# Patient Record
Sex: FEMALE | Race: WHITE | Employment: STUDENT | URBAN - METROPOLITAN AREA
[De-identification: names, ages, dates, MRNs, and addresses within clinical notes are randomized per-mention and may not be internally consistent; named-entity substitution may affect disease eponyms.]

---
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
## 2020-03-02 NOTE — PROGRESS NOTES
Luiz Abad is a 22year old female  Patient's last menstrual period was 2018 (exact date). Patient presents with:  ER F/U: on 07/15/2018. New pt. Positive preg. test in ER. Discuss US results.    .Patient went to ER with abdominal cramp breath  Gastrointestinal:  denies heartburn, abdominal pain, diarrhea or constipation  Genitourinary:  denies dysuria, incontinence, abnormal vaginal discharge, vaginal itching  Musculoskeletal:  denies back pain.   Skin/Breast:  Denies any breast pain, lum 52

## 2022-06-11 ENCOUNTER — OFFICE VISIT (OUTPATIENT)
Dept: FAMILY MEDICINE CLINIC | Facility: CLINIC | Age: 22
End: 2022-06-11
Payer: MEDICAID

## 2022-06-11 VITALS
SYSTOLIC BLOOD PRESSURE: 126 MMHG | HEART RATE: 86 BPM | RESPIRATION RATE: 16 BRPM | TEMPERATURE: 99 F | DIASTOLIC BLOOD PRESSURE: 70 MMHG | HEIGHT: 64 IN | OXYGEN SATURATION: 97 % | BODY MASS INDEX: 23.73 KG/M2 | WEIGHT: 139 LBS

## 2022-06-11 DIAGNOSIS — H92.01 RIGHT EAR PAIN: Primary | ICD-10-CM

## 2022-06-11 DIAGNOSIS — J35.8 TONSILLAR EXUDATE: ICD-10-CM

## 2022-06-11 LAB
CONTROL LINE PRESENT WITH A CLEAR BACKGROUND (YES/NO): YES YES/NO
STREP GRP A CUL-SCR: NEGATIVE

## 2022-06-11 PROCEDURE — 99202 OFFICE O/P NEW SF 15 MIN: CPT | Performed by: NURSE PRACTITIONER

## 2022-06-11 PROCEDURE — 87880 STREP A ASSAY W/OPTIC: CPT | Performed by: NURSE PRACTITIONER

## 2022-06-11 PROCEDURE — 3078F DIAST BP <80 MM HG: CPT | Performed by: NURSE PRACTITIONER

## 2022-06-11 PROCEDURE — 3074F SYST BP LT 130 MM HG: CPT | Performed by: NURSE PRACTITIONER

## 2022-06-11 PROCEDURE — 3008F BODY MASS INDEX DOCD: CPT | Performed by: NURSE PRACTITIONER

## 2022-06-12 LAB — SARS-COV-2 RNA RESP QL NAA+PROBE: NOT DETECTED

## (undated) DEVICE — SOL  .9 1000ML BTL

## (undated) DEVICE — NEEDLE SPINAL 22X3-1/2 BLK

## (undated) DEVICE — CANISTER SAFETOUCH SYST DISP

## (undated) DEVICE — Device

## (undated) DEVICE — KENDALL SCD EXPRESS SLEEVES, KNEE LENGTH, MEDIUM: Brand: KENDALL SCD

## (undated) DEVICE — TUBING SUCTION COLLECTION SET

## (undated) DEVICE — GLOVE SURG SENSICARE SZ 7-1/2

## (undated) DEVICE — SYRINGE 10ML LL CONTRL SYRINGE

## (undated) DEVICE — GYN CDS: Brand: MEDLINE INDUSTRIES, INC.

## (undated) NOTE — ED AVS SNAPSHOT
Rosalina Garner   MRN: [id number]    Department:  THE [facility] Emergency Department in [place]   Date of Visit:  10/10/2017           Disclosure     Insurance plans vary and the physician(s) referred by the ER may not be covered by your plan.  Please co If you have been prescribed any medication(s), please fill your prescription right away and begin taking the medication(s) as directed    If the emergency physician has read X-rays, these will be re-interpreted by a radiologist.  If there is a significant

## (undated) NOTE — ED AVS SNAPSHOT
Jeanette Hooper   MRN: [id number]    Department:  formerly [facility] Emergency Department in [place]   Date of Visit:  10/30/2017           Disclosure     Insurance plans vary and the physician(s) referred by the ER may not be covered by your plan.  Please co If you have been prescribed any medication(s), please fill your prescription right away and begin taking the medication(s) as directed    If the emergency physician has read X-rays, these will be re-interpreted by a radiologist.  If there is a significant

## (undated) NOTE — ED AVS SNAPSHOT
THE [facility] Emergency Department in [address] [facility]    Phone:  [phone]    Fax:  Angel Colbert   MRN: [id number]    Department:  THE [facility] Emergency Department in [place]   Date of Vis If you have any problems with your follow-up, please call our  at [phone]    Si usted tiene algun problema con walton sequimiento, por favor llame a nuestro adminstrador de casos al [phone]    Expect to receive an electronic request Kristin Mckeon [address] [facility] [place] ([address]) [address] [facility] [address] ([address] [facility] [address]) [address]  ([address] [facility]) [address] [facility] INDICATIONS:  6 wks pregnant, vag bleeding     TECHNIQUE:  Transabdominal and endovaginal pelvic ultrasound examinations were performed. MEASUREMENTS:     The uterus measures 9.2 x 4.6 x 5.0 cm.  there is a hypoechoic lesion with internal debris within

## (undated) NOTE — ED AVS SNAPSHOT
Maria Esther Morrison   MRN: [id number]    Department:  [facility] in [place]   Date of Visit:  7/15/2018           Disclosure     Insurance plans vary and the physician(s) referred by the ER may not be covered by your plan.  Please con tell this physician (or your personal doctor if your instructions are to return to your personal doctor) about any new or lasting problems. The primary care or specialist physician will see patients referred from the [facility] Emergency Department.  Salvadore Skiff

## (undated) NOTE — ED AVS SNAPSHOT
THE [facility] Emergency Department in [address] [facility]    Phone:  [phone]    Fax:  [address]   MRN: [id number]    Department:  THE [facility] Emergency Department in [place]   Date of Vis IF THERE IS ANY CHANGE OR WORSENING OF YOUR CONDITION, CALL YOUR PRIMARY CARE PHYSICIAN AT ONCE OR RETURN IMMEDIATELY TO THE EMERGENCY DEPARTMENT.     If you have been prescribed any medication(s), please fill your prescription right away and begin taking t

## (undated) NOTE — LETTER
Date: 3/9/2017    Patient Name: Luiz Abad          To Whom it may concern: This letter has been written at the patient's request. The above patient was seen at the [facility] for treatment of a medical condition.     This patient s

## (undated) NOTE — ED AVS SNAPSHOT
Gabe Mariee   MRN: [id number]    Department:  THE [facility] Emergency Department in [place]   Date of Visit:  8/17/2018           Disclosure     Insurance plans vary and the physician(s) referred by the ER may not be covered by your plan.  Please con tell this physician (or your personal doctor if your instructions are to return to your personal doctor) about any new or lasting problems. The primary care or specialist physician will see patients referred from the [facility] Emergency Department.  Mick Patino

## (undated) NOTE — LETTER
October 30, 2017    Patient: Tania Galarza   Date of Visit: 10/30/2017       To Whom It May Concern:    Rachael Singh was seen and treated in our emergency department on 10/30/2017. She should not return to school until 10/31/17.     If you have

## (undated) NOTE — IP AVS SNAPSHOT
[facility] [place] [address] [place], [address] ~ [phone]                Discharge Summary   3/9/2017    Eugenia Ibarra [address] [facility]           Admission Information        Provider Department    3/9/2017 Solitario Jimenez MD  Pre- You received a drug called Toradol which is an Anti Inflammatory at:9 PM  If you are allowed to take Anti inflammatories:3 AM    Do not take any Anti Inflammatory like Motrin, Aleve or Ibuprophen until after:3 AM  · Please report any suspected allergic claude WBC RBC Hemoglobin Hematocrit MCV MCH MCHC RDW Platelet MPV    (80/85/81)  11.7 (03/09/17)  4.21 (03/09/17)  12.5 (03/09/17)  36.6 (03/09/17)  86.9 -- -- -- (03/09/17)  248.0 --    (02/23/17)  7.9 (02/23/17)  4.28 (02/23/17)  12.7 (02/23/17)  38.0 (02/23/ coverage. Patient [address] is a [employer] program that can help with your Affordable Care Act coverage, as well as all types of Medicaid plans.   To get signed up and covered, please call [phone] and ask to get set up for an insuran

## (undated) NOTE — LETTER
March 7, 2017    Patient: Danielle Hawk   Date of Visit: 03/07/17       To Whom It May Concern:    Lotus Sinha was seen in our emergency department on 03/07/17. She can return to school.     If you have any questions or concerns, please don't

## (undated) NOTE — LETTER
October 10, 2017    Patient: Maylin Freitas   Date of Visit: 10/10/2017       To Whom It May Concern:    Chente Colon was seen and treated in our emergency department on 10/10/2017. She can return to work and school.     If you have any question